# Patient Record
Sex: MALE | Race: BLACK OR AFRICAN AMERICAN | Employment: OTHER | URBAN - METROPOLITAN AREA
[De-identification: names, ages, dates, MRNs, and addresses within clinical notes are randomized per-mention and may not be internally consistent; named-entity substitution may affect disease eponyms.]

---

## 2017-04-06 RX ORDER — ALBUTEROL SULFATE 90 UG/1
2 AEROSOL, METERED RESPIRATORY (INHALATION)
Qty: 1 INHALER | Refills: 5 | Status: SHIPPED | OUTPATIENT
Start: 2017-04-06 | End: 2019-04-17 | Stop reason: SDUPTHER

## 2018-04-16 ENCOUNTER — OFFICE VISIT (OUTPATIENT)
Dept: FAMILY MEDICINE CLINIC | Age: 48
End: 2018-04-16

## 2018-04-16 VITALS
BODY MASS INDEX: 27.47 KG/M2 | HEART RATE: 91 BPM | DIASTOLIC BLOOD PRESSURE: 89 MMHG | OXYGEN SATURATION: 94 % | WEIGHT: 175 LBS | SYSTOLIC BLOOD PRESSURE: 145 MMHG | RESPIRATION RATE: 18 BRPM | HEIGHT: 67 IN | TEMPERATURE: 98 F

## 2018-04-16 DIAGNOSIS — Z91.09 POLLEN ALLERGIES: Primary | ICD-10-CM

## 2018-04-16 DIAGNOSIS — J45.909 UNCOMPLICATED ASTHMA, UNSPECIFIED ASTHMA SEVERITY, UNSPECIFIED WHETHER PERSISTENT: ICD-10-CM

## 2018-04-16 RX ORDER — ALBUTEROL SULFATE 90 UG/1
2 AEROSOL, METERED RESPIRATORY (INHALATION)
Qty: 1 INHALER | Refills: 5 | Status: SHIPPED | OUTPATIENT
Start: 2018-04-16 | End: 2019-04-11

## 2018-04-16 RX ORDER — CARBINOXAMINE MALEATE 4 MG/1
TABLET ORAL
Qty: 60 TAB | Refills: 1 | Status: SHIPPED | OUTPATIENT
Start: 2018-04-16

## 2018-04-16 NOTE — MR AVS SNAPSHOT
[address] [address] 
[phone] Patient: Jacob Puentes MRN: [id number] :1970 Visit Information Date & Time Provider Department Dept. Phone Encounter #  
 2018  3:15 PM Devante Bosch MD [address] [facility] [phone] [id number] Follow-up Instructions Return if symptoms worsen or fail to improve. Upcoming Health Maintenance Date Due DTaP/Tdap/Td series (1 - Tdap) 10/26/1991 Influenza Age 5 to Adult 2017 Allergies as of 2018  Review Complete On: 2018 By: Devante Bosch MD  
 No Known Allergies Current Immunizations  Reviewed on 2015 No immunizations on file. Not reviewed this visit You Were Diagnosed With   
  
 Codes Comments Pollen allergies    -  Primary ICD-10-CM: J30.1 ICD-9-CM: 477.0 Uncomplicated asthma, unspecified asthma severity, unspecified whether persistent     ICD-10-CM: J45.909 ICD-9-CM: 493.90 Vitals BP Pulse Temp Resp Height(growth percentile) Weight(growth percentile) 145/89 91 98 °F (36.7 °C) 18 5' 7\" (1.702 m) 175 lb (79.4 kg) SpO2 BMI Smoking Status 94% 27.41 kg/m2 Never Smoker Vitals History BMI and BSA Data Body Mass Index Body Surface Area  
 27.41 kg/m 2 1.94 m 2 Preferred Pharmacy Pharmacy Name Phone [id number] [facility], [address] [phone] Your Updated Medication List  
  
   
This list is accurate as of 18  4:07 PM.  Always use your most recent med list.  
  
  
  
  
 albuterol 90 mcg/actuation inhaler Commonly known as:  PROVENTIL HFA, VENTOLIN HFA, PROAIR HFA Take 2 Puffs by inhalation every four (4) hours as needed for Wheezing for up to 360 days. carbinoxamine maleate 4 mg Tab  
1 tablet bid for allergies  
  
 cetirizine 10 mg tablet Commonly known as:  ZyrTEC Take 1 Tab by mouth daily. inhalational spacing device Commonly known as:  AEROCHAMBER  
1 Each by Does Not Apply route as needed. methylPREDNISolone (PF) 125 mg/2 mL Solr Commonly known as:  SOLU-MEDROL 2 mL by IntraVENous route once for 1 dose. Prescriptions Sent to Pharmacy Refills  
 carbinoxamine maleate 4 mg tab 1 Si tablet bid for allergies Class: Normal  
 [facility] [address] Ph #: [phone]  
 albuterol (PROVENTIL HFA, VENTOLIN HFA, PROAIR HFA) 90 mcg/actuation inhaler 5 Sig: Take 2 Puffs by inhalation every four (4) hours as needed for Wheezing for up to 360 days. Class: Normal  
 Pharmacy: [address] [place] [address], [address] [facility] Ph #: [phone] Route: Inhalation We Performed the Following METHYLPREDNISOLONE INJECTION [ HCPCS] [place] THER/PROPH/DIAG INJECTION, SUBCUT/IM [id number] CPT(R)] Follow-up Instructions Return if symptoms worsen or fail to improve. Introducing [facility] HEALTH SERVICES! Dear Shaun Cottrell: Thank you for requesting a [employer] account. Our records indicate that you have previously registered for a [employer] account but its currently inactive. Please call our [employer] support line at [phone]. Additional Information If you have questions, please visit the Frequently Asked Questions section of the [employer] website at [email or web address] Remember, [employer] is NOT to be used for urgent needs. For medical emergencies, dial 911. Now available from your iPhone and Android! Please provide this summary of care documentation to your next provider. Your primary care clinician is listed as MANAV ESPARZA. If you have any questions after today's visit, please call [phone].

## 2018-04-16 NOTE — PROGRESS NOTES
Patient here for allergy sx. Eyes swollen, congestion. He has tried zyrtec, and allegra, and otc meds. He states he usually needs an inhaler this time of year. 1. Have you been to the ER, urgent care clinic since your last visit? Hospitalized since your last visit? No    2. Have you seen or consulted any other health care providers outside of the [address] [place] [address] since your last visit? Include any pap smears or colon screening. No       Chief Complaint   Patient presents with    Allergic Rhinitis     eyes swollen, congestion     He is a 52 y.o. male who presents for evalution. Reviewed PmHx, RxHx, FmHx, SocHx, AllgHx and updated and dated in the chart. Patient Active Problem List    Diagnosis    Asthma       Review of Systems - negative except as listed above in the HPI    Objective:     Vitals:    04/16/18 1555   BP: 145/89   Pulse: 91   Resp: 18   Temp: 98 °F (36.7 °C)   SpO2: 94%   Weight: 175 lb (79.4 kg)   Height: 5' 7\" (1.702 m)     Physical Examination: General appearance - alert, well appearing, and in no distress  bilats almost closed shut    Assessment/ Plan:   Diagnoses and all orders for this visit:    1. Pollen allergies  -     methylPREDNISolone, PF, (SOLU-MEDROL) 125 mg/2 mL solr; 2 mL by IntraVENous route once for 1 dose. -     METHYLPREDNISOLONE INJECTION (Qty 2)  -     THER/PROPH/DIAG INJECTION, SUBCUT/IM  -     carbinoxamine maleate 4 mg tab; 1 tablet bid for allergies    2. Uncomplicated asthma, unspecified asthma severity, unspecified whether persistent  -     albuterol (PROVENTIL HFA, VENTOLIN HFA, PROAIR HFA) 90 mcg/actuation inhaler; Take 2 Puffs by inhalation every four (4) hours as needed for Wheezing for up to 360 days. Follow-up Disposition:  Return if symptoms worsen or fail to improve. I have discussed the diagnosis with the patient and the intended plan as seen in the above orders. The patient understands and agrees with the plan.  The patient has received an after-visit summary and questions were answered concerning future plans. Medication Side Effects and Warnings were discussed with patient  Patient Labs were reviewed and or requested:  Patient Past Records were reviewed and or requested    Jacque Lechuga M.D. There are no Patient Instructions on file for this visit.

## 2019-04-17 RX ORDER — ALBUTEROL SULFATE 90 UG/1
2 AEROSOL, METERED RESPIRATORY (INHALATION)
Qty: 1 INHALER | Refills: 5 | Status: SHIPPED | OUTPATIENT
Start: 2019-04-17 | End: 2020-04-11

## 2019-04-26 ENCOUNTER — TELEPHONE (OUTPATIENT)
Dept: FAMILY MEDICINE CLINIC | Age: 49
End: 2019-04-26

## 2019-04-26 NOTE — TELEPHONE ENCOUNTER
Patient called in and is asking if you could call in an inhaler that is stronger than the albuterol please. If so please send to the pharmacy on file. Call back number for him is [phone]. Thanks.

## 2019-04-29 RX ORDER — BUDESONIDE AND FORMOTEROL FUMARATE DIHYDRATE 80; 4.5 UG/1; UG/1
2 AEROSOL RESPIRATORY (INHALATION) 2 TIMES DAILY
Qty: 1 INHALER | Refills: 2 | Status: SHIPPED | OUTPATIENT
Start: 2019-04-29 | End: 2020-04-29

## 2020-03-12 ENCOUNTER — TELEPHONE (OUTPATIENT)
Dept: FAMILY MEDICINE CLINIC | Age: 50
End: 2020-03-12

## 2020-03-12 NOTE — TELEPHONE ENCOUNTER
Patient called and states that he would like to be referred to an allergist in [place] or on [address] [facility]. Please call him back at [phone] and leave  with name and number.

## 2020-04-17 RX ORDER — ALBUTEROL SULFATE 90 UG/1
AEROSOL, METERED RESPIRATORY (INHALATION)
Qty: 8.5 INHALER | Refills: 5 | Status: SHIPPED | OUTPATIENT
Start: 2020-04-17 | End: 2020-09-14

## 2020-04-17 NOTE — TELEPHONE ENCOUNTER
----- Message from Luis Beltran sent at 4/17/2020 11:08 AM EDT -----  Regarding: Dr. Soraida Herrmann    Best contact number(s): [phone]    Name of medication and dosage if known: albuterol     Is patient out of this medication (yes/no): a little under half left    [facility] [address] [place] [address] listed in chart? (yes/no): yes    Pharmacy phone number: yes    Date of last visit: Monday, April 16, 2018

## 2020-04-28 ENCOUNTER — TELEPHONE (OUTPATIENT)
Dept: FAMILY MEDICINE CLINIC | Age: 50
End: 2020-04-28

## 2020-04-28 ENCOUNTER — VIRTUAL VISIT (OUTPATIENT)
Dept: FAMILY MEDICINE CLINIC | Age: 50
End: 2020-04-28

## 2020-04-28 DIAGNOSIS — J45.909 UNCOMPLICATED ASTHMA, UNSPECIFIED ASTHMA SEVERITY, UNSPECIFIED WHETHER PERSISTENT: Primary | ICD-10-CM

## 2020-04-28 RX ORDER — BUDESONIDE AND FORMOTEROL FUMARATE DIHYDRATE 80; 4.5 UG/1; UG/1
2 AEROSOL RESPIRATORY (INHALATION) 2 TIMES DAILY
Qty: 1 INHALER | Refills: 5 | Status: SHIPPED | OUTPATIENT
Start: 2020-04-28 | End: 2020-04-29

## 2020-04-28 NOTE — PROGRESS NOTES
Here for Vv    Would like a stronger inhaler--albuterol. Having to use it too much. Went to ER last week with [facility] and went to GI. Consent: Steven Soni, who was seen by synchronous (real-time) audio-video technology, and/or his healthcare decision maker, is aware that this patient-initiated, Telehealth encounter on 4/28/2020 is a billable service, with coverage as determined by his insurance carrier. He is aware that he may receive a bill and has provided verbal consent to proceed: Yes. 712  Subjective:   Steven Soni is a 52 y.o. male who was seen for No chief complaint on file. Prior to Admission medications    Medication Sig Start Date End Date Taking? Authorizing Provider   budesonide-formoteroL (SYMBICORT) 80-4.5 mcg/actuation HFAA Take 2 Puffs by inhalation two (2) times a day. 4/28/20  Yes Prasad Wong MD   albuterol (PROVENTIL HFA, VENTOLIN HFA, PROAIR HFA) 90 mcg/actuation inhaler TAKE 2 PUFFS BY INHALATION EVERY FOUR (4) HOURS AS NEEDED FOR WHEEZING 4/17/20   Prasad Wong MD   budesonide-formoterol [facility]) 80-4.5 mcg/actuation HFAA Take 2 Puffs by inhalation two (2) times a day. 4/29/19   Prasad Wong MD   carbinoxamine maleate 4 mg tab 1 tablet bid for allergies 4/16/18   Prasad Wong MD   cetirizine (ZYRTEC) 10 mg tablet Take 1 Tab by mouth daily. 3/24/12   Sabrina Boehringer, PA   Inhalational Spacing Device (AEROCHAMBER) 1 Each by Does Not Apply route as needed. 3/24/12   Sabrina Boehringer, PA     No Known Allergies  Patient Active Problem List    Diagnosis    Asthma     Patient Active Problem List   Diagnosis Code    Asthma J45.909       ROS    Objective:   Vital Signs: (As obtained by patient/caregiver at home)  There were no vitals taken for this visit.      [INSTRUCTIONS:  \"[x]\" Indicates a positive item  \"[]\" Indicates a negative item  -- DELETE ALL ITEMS NOT EXAMINED]    Constitutional: [x] Appears well-developed and well-nourished [x] No apparent distress      [] Abnormal -     Mental status: [x] Alert and awake  [x] Oriented to person/place/time [x] Able to follow commands    [] Abnormal -     Eyes:   EOM    [x]  Normal    [] Abnormal -   Sclera  [x]  Normal    [] Abnormal -          Discharge [x]  None visible   [] Abnormal -     HENT: [x] Normocephalic, atraumatic  [] Abnormal -   [x] Mouth/Throat: Mucous membranes are moist    External Ears [x] Normal  [] Abnormal -    Neck: [x] No visualized mass [] Abnormal -     Pulmonary/Chest: [x] Respiratory effort normal   [x] No visualized signs of difficulty breathing or respiratory distress        [] Abnormal -        Neurological:        [x] No Facial Asymmetry (Cranial nerve 7 motor function) (limited exam due to video visit)          [x] No gaze palsy        [] Abnormal -          Skin:        [x] No significant exanthematous lesions or discoloration noted on facial skin         [] Abnormal -            Psychiatric:       [x] Normal Affect [] Abnormal -        [x] No Hallucinations    Other pertinent observable physical exam findings:-              Assessment & Plan:   Diagnoses and all orders for this visit:    1. Uncomplicated asthma, unspecified asthma severity, unspecified whether persistent  -add new rx  -may use alb as rescue    Other orders  -     budesonide-formoteroL (SYMBICORT) 80-4.5 mcg/actuation HFAA; Take 2 Puffs by inhalation two (2) times a day. We discussed the expected course, resolution and complications of the diagnosis(es) in detail. Medication risks, benefits, costs, interactions, and alternatives were discussed as indicated. I advised him to contact the office if his condition worsens, changes or fails to improve as anticipated. He expressed understanding with the diagnosis(es) and plan. Tara Naranjo is a 52 y.o. male being evaluated by a video visit encounter for concerns as above. A caregiver was present when appropriate.  Due to this being a TeleHealth encounter ([facility]), evaluation of the following organ systems was limited: Vitals/Constitutional/EENT/Resp/CV/GI//MS/Neuro/Skin/Heme-Lymph-Imm. Pursuant to the emergency declaration under the [address] [place] [address], [facility] waiver authority and the [employer] Act, this Virtual  Visit was conducted, with patient's (and/or legal guardian's) consent, to reduce the patient's risk of exposure to COVID-19 and provide necessary medical care. Services were provided through a video synchronous discussion virtually to substitute for in-person clinic visit. Patient and provider were located at their individual homes.         Luis Alberto Bah MD

## 2020-04-28 NOTE — TELEPHONE ENCOUNTER
Patient called back stating that the cost of the inhaler you called is $750.00    He wants something else around the cost of $45.00    Rx [facility] [address]

## 2020-09-14 RX ORDER — ALBUTEROL SULFATE 90 UG/1
AEROSOL, METERED RESPIRATORY (INHALATION)
Qty: 8.5 INHALER | Refills: 5 | Status: SHIPPED | OUTPATIENT
Start: 2020-09-14 | End: 2021-12-15

## 2020-12-28 LAB — SARS-COV-2, NAA: NEGATIVE

## 2021-09-21 ENCOUNTER — OFFICE VISIT (OUTPATIENT)
Dept: FAMILY MEDICINE CLINIC | Age: 51
End: 2021-09-21

## 2021-09-21 VITALS
DIASTOLIC BLOOD PRESSURE: 95 MMHG | HEIGHT: 67 IN | WEIGHT: 184 LBS | HEART RATE: 87 BPM | BODY MASS INDEX: 28.88 KG/M2 | TEMPERATURE: 98.5 F | SYSTOLIC BLOOD PRESSURE: 167 MMHG | OXYGEN SATURATION: 96 % | RESPIRATION RATE: 20 BRPM

## 2021-09-21 DIAGNOSIS — M54.50 LOW BACK PAIN, UNSPECIFIED BACK PAIN LATERALITY, UNSPECIFIED CHRONICITY, UNSPECIFIED WHETHER SCIATICA PRESENT: Primary | ICD-10-CM

## 2021-09-21 PROCEDURE — 99213 OFFICE O/P EST LOW 20 MIN: CPT | Performed by: FAMILY MEDICINE

## 2021-09-21 RX ORDER — PREDNISONE 10 MG/1
TABLET ORAL
Qty: 1 DOSE PACK | Refills: 0 | Status: SHIPPED | OUTPATIENT
Start: 2021-09-21

## 2021-09-21 NOTE — PROGRESS NOTES
Patient here for right back pain x 3 weeks. He was at [facility] and kidneys were checked. He has stabbing pain when he moves suddenly. They put him on pain medication and muscle spasm medication. ( ibuprofen 400 mg, methocarbamol 750 mg)  8/10 pain scale. He states he gets no relief from these medications. 1. Have you been to the ER, urgent care clinic since your last visit? Hospitalized since your last visit? No    2. Have you seen or consulted any other health care providers outside of the [address] [place] [address] since your last visit? Include any pap smears or colon screening. No              Chief Complaint   Patient presents with    Back Pain     8/10     He is a 48 y.o. male who presents for evalution. Reviewed PmHx, RxHx, FmHx, SocHx, AllgHx and updated and dated in the chart. Patient Active Problem List    Diagnosis    Asthma       Review of Systems - negative except as listed above in the HPI    Objective:     Vitals:    09/21/21 0735   BP: (!) 167/95   Pulse: 87   Resp: 20   Temp: 98.5 °F (36.9 °C)   SpO2: 96%   Weight: 184 lb (83.5 kg)   Height: 5' 7\" (1.702 m)         Assessment/ Plan:   Diagnoses and all orders for this visit:    1. Low back pain, unspecified back pain laterality, unspecified chronicity, unspecified whether sciatica present  -     predniSONE (STERAPRED DS) 10 mg dose pack; 12 day DS taper pack as directed  -inc pain most likely source of inc bp, asked pt to check bp at home             I have discussed the diagnosis with the patient and the intended plan as seen in the above orders. The patient understands and agrees with the plan. The patient has received an after-visit summary and questions were answered concerning future plans. Medication Side Effects and Warnings were discussed with patient  Patient Labs were reviewed and or requested:  Patient Past Records were reviewed and or requested    Andrés Hutton M.D.     There are no Patient Instructions on file for this visit.

## 2021-09-29 ENCOUNTER — NURSE TRIAGE (OUTPATIENT)
Dept: OTHER | Facility: CLINIC | Age: 51
End: 2021-09-29

## 2021-09-29 NOTE — TELEPHONE ENCOUNTER
Received call from  Olga Funk at [facility] with Red Flag Complaint. Pt is  Not with the caller   Offered to merge caller in  She states he may not answer   Brief description of triage: 49 y/o with    New onset leg weakness, x 3 days  States his legs are giving out if he sits for 5 minutes he has to hold on to walk,  Also reports in the morning he has to have assistance to walk he has extreme pain pt had back pain that resolved and has  Now moved to his legs     Triage indicates for patient to  Go to [facility]   Pt is at work he has to calls someone  To come to relieve him so he can go to the ED     Care advice provided, patient verbalizes understanding; denies any other questions or concerns; instructed to call back for any new or worsening symptoms. Attention Provider: Thank you for allowing me to participate in the care of your patient. The patient was connected to triage in response to information provided to the ECC. Please do not respond through this encounter as the response is not directed to a shared pool. Reason for Disposition   Patient sounds very sick or weak to the triager    Answer Assessment - Initial Assessment Questions  1. ONSET: \"When did the pain start? \"       3 days ago     2. LOCATION: \"Where is the pain located? \"       Leg pain     3. PAIN: \"How bad is the pain? \"    (Scale 1-10; or mild, moderate, severe)    -  MILD (1-3): doesn't interfere with normal activities     -  MODERATE (4-7): interferes with normal activities (e.g., work or school) or awakens from sleep, limping     -  SEVERE (8-10): excruciating pain, unable to do any normal activities, unable to walk      Severe pain     4. WORK OR EXERCISE: \"Has there been any recent work or exercise that involved this part of the body? \"       Denies     5. CAUSE: \"What do you think is causing the leg pain? \"      After having backpain     6. OTHER SYMPTOMS: \"Do you have any other symptoms? \" (e.g., chest pain, back pain, breathing difficulty, swelling, rash, fever, numbness, weakness)     Weakness,  Numbness,     7. PREGNANCY: \"Is there any chance you are pregnant? \" \"When was your last menstrual period? \"      Denies    Protocols used: LEG PAIN-ADULT-AH Home

## 2021-11-18 LAB — SARS-COV-2: POSITIVE

## 2021-11-29 LAB — SARS-COV-2, NAA: NEGATIVE

## 2021-11-30 ENCOUNTER — TELEPHONE (OUTPATIENT)
Dept: FAMILY MEDICINE CLINIC | Age: 51
End: 2021-11-30

## 2021-11-30 NOTE — TELEPHONE ENCOUNTER
Left  [phone] stating patient to call back and set up a VV. He was tested positive for COVID but is now wanting to return to work.

## 2021-12-01 ENCOUNTER — VIRTUAL VISIT (OUTPATIENT)
Dept: FAMILY MEDICINE CLINIC | Age: 51
End: 2021-12-01

## 2021-12-01 DIAGNOSIS — J01.90 ACUTE BACTERIAL SINUSITIS: Primary | ICD-10-CM

## 2021-12-01 DIAGNOSIS — U09.9 POST COVID-19 CONDITION, UNSPECIFIED: ICD-10-CM

## 2021-12-01 DIAGNOSIS — B96.89 ACUTE BACTERIAL SINUSITIS: Primary | ICD-10-CM

## 2021-12-01 PROCEDURE — 99443 PR PHYS/QHP TELEPHONE EVALUATION 21-30 MIN: CPT | Performed by: FAMILY MEDICINE

## 2021-12-01 RX ORDER — AMOXICILLIN AND CLAVULANATE POTASSIUM 875; 125 MG/1; MG/1
1 TABLET, FILM COATED ORAL EVERY 12 HOURS
Qty: 14 TABLET | Refills: 0 | Status: SHIPPED | OUTPATIENT
Start: 2021-12-01 | End: 2021-12-08

## 2021-12-01 NOTE — PROGRESS NOTES
Travis Martel (: 1970) is a 46 y.o. male, established patient, here for evaluation of the following chief complaint(s):   Post-COVID Symptoms       ASSESSMENT/PLAN:  Below is the assessment and plan developed based on review of pertinent history, labs, studies, and medications. 1. Acute bacterial sinusitis  Likely bacterial sinusitis causing sinus symptoms post resolution of COVID-19 infection. Start augmentin. Encouraged increased fluid intake and rest.   Reassured patient he is no longer contagious from COVID-19 with negative test and appropriate 10 day quarantine. Letter provided for patient to return to work 2021.   -     amoxicillin-clavulanate (AUGMENTIN) 875-125 mg per tablet; Take 1 Tablet by mouth every twelve (12) hours for 7 days. , Normal, Disp-14 Tablet, R-0    2. Post covid-19 condition, unspecified  Continue mucinex PRN. Return to clinic if cough does not resolve or begin to improve after completion of antibiotics. No follow-ups on file. SUBJECTIVE/OBJECTIVE:  Chief Complaint   Patient presents with    Post-COVID Symptoms     Patient was diagnosed with COVID 2021 after 2 days of symptoms. He experienced body aches, fevers, chills, nasal congestion, cough. He had repeat test  which was negative for COVID. Since this, patient reports persistent nasal congestion, sinus pain, rhinorrhea, post nasal drip. These symptoms started toward the end of his 10 day quarantine and have worsened in last 5 days. He reports persistent cough in evenings and first thing in morning productive with white sputum. He is taking mucinex which helps with cough but not congestion. Denies current fever, chills, SOB, chest pain, sore throat, body aches. Review of systems negative other than mentioned in HPI    No data recorded    Physical Exam  Pulmonary:      Comments: Pulmonary effort normal throughout telephone call. Patient is able to speak in full sentences without breathlessness. Neurological:      Mental Status: He is alert and oriented to person, place, and time. On this date 12/01/2021 I have spent 30 minutes reviewing previous notes, test results and face to face (virtual) with the patient discussing the diagnosis and importance of compliance with the treatment plan as well as documenting on the day of the visit. Indiana Mcdermott is a 46 y.o. male being evaluated by a telephone encounter to address concerns as mentioned above. This was done instead of a Virtual Visit (video visit) as her phone was unable to access A/VJuliette Hare A caregiver was present when appropriate. Due to this being a TeleHealth encounter (During [facility] emergency), evaluation of the following organ systems was limited: Vitals/Constitutional/EENT/Resp/CV/GI//MS/Neuro/Skin/Heme-Lymph-Imm. Pursuant to the emergency declaration under the [address] [place] [address], [address] [place] [address] authority and the [employer] Act, this Virtual Visit was conducted with patient's (and/or legal guardian's) consent, to reduce the risk of exposure to COVID-19 and provide necessary medical care. Services were provided through a telephone discussion to substitute for in-person encounter. An electronic signature was used to authenticate this note.   -- Dory Carlos PA-C

## 2021-12-01 NOTE — PROGRESS NOTES
Chief Complaint   Patient presents with    Post-COVID Symptoms     Pt states that he was diagnosed with covid, recently had a test that was negative but is still showing sx  -pt states that he is having a headache, earache and states he has a terrible cough    1. Have you been to the ER, urgent care clinic since your last visit? Hospitalized since your last visit? Urgent care    2. Have you seen or consulted any other health care providers outside of the [address] [place] [address] since your last visit? Include any pap smears or colon screening.  No     Pt has no other concerns

## 2021-12-01 NOTE — LETTER
NOTIFICATION RETURN TO WORK / SCHOOL    12/1/2021 4:12 PM    Mr. Jackie Martinez   [address] [facility] [address] [id number]      To Whom It May Concern:    Francisco Day is currently under the care of [address]. He will return to work/school on: 12/7/2021    If there are questions or concerns please have the patient contact our office.         Sincerely,      Nicol Ware PA-C

## 2021-12-15 RX ORDER — ALBUTEROL SULFATE 90 UG/1
AEROSOL, METERED RESPIRATORY (INHALATION)
Qty: 8.5 EACH | Refills: 5 | Status: SHIPPED | OUTPATIENT
Start: 2021-12-15

## 2022-03-21 ENCOUNTER — TELEPHONE (OUTPATIENT)
Dept: FAMILY MEDICINE CLINIC | Age: 52
End: 2022-03-21

## 2022-03-21 NOTE — TELEPHONE ENCOUNTER
Called and spoke to patient. Rober Thomas) Patient states approval of work letter as written and states he will  at [facility] office . l

## 2022-03-21 NOTE — TELEPHONE ENCOUNTER
----- Message from Jose Luis Duran sent at 3/21/2022  4:19 PM EDT -----  Subject: Message to Provider    QUESTIONS  Information for Provider? patient is requesting a written letter stating   pt's prescibed medication albuterol (PROVENTIL HFA, VENTOLIN HFA, PROAIR   HFA) 90 mcg/actuation inhaler and a description of what the medication   contains for pt 's employer due to mandatory breath test at pt 's   employment ; patient would like a call when letter is ready for  .  ---------------------------------------------------------------------------  --------------  [address]  What is the best way for the office to contact you? OK to leave message on   voicemail  Preferred Call Back Phone Number? [phone]  ---------------------------------------------------------------------------  --------------  SCRIPT ANSWERS  Relationship to Patient?  Self

## 2022-09-13 ENCOUNTER — DOCUMENTATION ONLY (OUTPATIENT)
Dept: FAMILY MEDICINE CLINIC | Age: 52
End: 2022-09-13

## 2022-09-13 NOTE — PROGRESS NOTES
Returned pts call, lvm letting them know I scheduled him for 9.14.22 at 2:00. Did let them know to call us and let us know if they can make it or not.

## 2022-09-16 LAB — SARS-COV-2: NEGATIVE

## 2023-08-25 ENCOUNTER — TELEPHONE (OUTPATIENT)
Age: 53
End: 2023-08-25

## 2023-08-25 NOTE — TELEPHONE ENCOUNTER
----- Message from Den Stephanie sent at 8/25/2023 10:56 AM EDT -----  Subject: Appointment Request    Reason for Call: Established Patient Appointment needed: Routine Existing   Condition Follow Up    QUESTIONS    Reason for appointment request? No appointments available during search     Additional Information for Provider? Wife called needing an in office appt   for a BP for the patients job. She needs sooner that October.  Please   return call.  ---------------------------------------------------------------------------  --------------  Cookie Marker INFO  [phone]; OK to leave message on voicemail  ---------------------------------------------------------------------------  --------------  SCRIPT ANSWERS

## 2024-01-09 ENCOUNTER — OFFICE VISIT (OUTPATIENT)
Age: 54
End: 2024-01-09
Payer: COMMERCIAL

## 2024-01-09 VITALS
BODY MASS INDEX: 27.31 KG/M2 | TEMPERATURE: 98.1 F | SYSTOLIC BLOOD PRESSURE: 154 MMHG | HEIGHT: 67 IN | WEIGHT: 174 LBS | DIASTOLIC BLOOD PRESSURE: 94 MMHG | RESPIRATION RATE: 16 BRPM | HEART RATE: 80 BPM | OXYGEN SATURATION: 97 %

## 2024-01-09 DIAGNOSIS — R03.0 ELEVATED BLOOD PRESSURE READING: Primary | ICD-10-CM

## 2024-01-09 DIAGNOSIS — N52.9 ERECTILE DYSFUNCTION, UNSPECIFIED ERECTILE DYSFUNCTION TYPE: ICD-10-CM

## 2024-01-09 PROCEDURE — 99214 OFFICE O/P EST MOD 30 MIN: CPT | Performed by: FAMILY MEDICINE

## 2024-01-09 RX ORDER — LOSARTAN POTASSIUM AND HYDROCHLOROTHIAZIDE 12.5; 5 MG/1; MG/1
1 TABLET ORAL DAILY
Qty: 90 TABLET | Refills: 1 | Status: SHIPPED | OUTPATIENT
Start: 2024-01-09

## 2024-01-09 RX ORDER — SILDENAFIL 100 MG/1
100 TABLET, FILM COATED ORAL PRN
Qty: 6 TABLET | Refills: 3 | Status: SHIPPED | OUTPATIENT
Start: 2024-01-09

## 2024-01-09 NOTE — PROGRESS NOTES
Chief Complaint   Patient presents with    Erectile Dysfunction    Back Pain     1. Have you been to the ER, urgent care clinic since your last visit?  Hospitalized since your last visit?No    2. Have you seen or consulted any other health care providers outside of the [facility] since your last visit?  Include any pap smears or colon screening. No    
that time.   2. Erectile dysfunction, unspecified erectile dysfunction type  N52.9 sildenafil (VIAGRA) 100 MG tablet   Patient complains of difficulty with getting and maintaining erection.  Will place on Viagra.  Discussed patient expectations and outcomes.        Time was used for level of billing: no     Orders Placed This Encounter    losartan-hydroCHLOROthiazide (HYZAAR) 50-12.5 MG per tablet     Sig: Take 1 tablet by mouth daily     Dispense:  90 tablet     Refill:  1    sildenafil (VIAGRA) 100 MG tablet     Sig: Take 1 tablet by mouth as needed for Erectile Dysfunction     Dispense:  6 tablet     Refill:  3      Follow-up and Dispositions    Return in about 1 month (around 2/9/2024) for HTN.          I have discussed the diagnosis with the patient and the intended plan as seen in the above orders.  The patient understands and agrees with the plan. The patient has received an after-visit summary and questions were answered concerning future plans.     Medication Side Effects and Warnings were discussed with patient  Patient Labs were reviewed and or requested:  Patient Past Records were reviewed and or requested    Please note that this dictation was completed with [employer], the [employer] voice recognition software.  Quite often unanticipated grammatical, syntax, homophones, and other interpretive errors are inadvertently transcribed by the computer software.  Please disregard these errors.  Please excuse any errors that have escaped final proofreading.  Thank you.     Simon Murguia M.D.    There are no Patient Instructions on file for this visit.

## 2025-02-05 ENCOUNTER — COMMUNITY OUTREACH (OUTPATIENT)
Facility: CLINIC | Age: 55
End: 2025-02-05

## 2025-02-06 ENCOUNTER — TELEPHONE (OUTPATIENT)
Facility: CLINIC | Age: 55
End: 2025-02-06

## 2025-02-06 DIAGNOSIS — N52.9 ERECTILE DYSFUNCTION, UNSPECIFIED ERECTILE DYSFUNCTION TYPE: ICD-10-CM

## 2025-02-06 RX ORDER — SILDENAFIL 100 MG/1
100 TABLET, FILM COATED ORAL PRN
Qty: 6 TABLET | Refills: 3 | Status: SHIPPED | OUTPATIENT
Start: 2025-02-06

## 2025-02-06 NOTE — TELEPHONE ENCOUNTER
[facility] needs a refill of sildenafil (VIAGRA) 100 MG tablet .  They have 0 pills/supply left and are requesting a 90 day supply with refills.  Pharmacy has been updated in the chart. Patient was advised or scheduled an appointment for the future and to request refills thru the [facility] or by requesting a refill from their pharmacy in the future.  Patient was also advised to check with their pharmacy for status of when refills are available.